# Patient Record
(demographics unavailable — no encounter records)

---

## 2025-05-21 NOTE — END OF VISIT
[Time Spent: ___ minutes] : I have spent [unfilled] minutes of time on the encounter which excludes teaching and separately reported services. Prenatal Vitamins

## 2025-05-21 NOTE — DISCUSSION/SUMMARY
[FreeTextEntry1] : GYN evaluation today TVS done today -- Fibroid uterus, Amenorrhea and AUB. Extremely limited scan due to shadowing from myoma, TVS and TAS approach utilized Anteverted myomatous uterus is noted, endometrium difficult to discern Possible fetal pole noted measuring 6w5d (No fhm seen at this time) Largest fibroid measuring 8.31x6.77x8.00cm  Cervix and ovaries are not visible due to shadowing from myomas. We discussed -- Fibroid uterus, Amenorrhea and AUB causes and treatment options. PLAN: We discussed patient is to f/u next week with [facility] to check fetal heart and with our office the following week unless abnormal findings with [facility]. TVS done today for Fibroid uterus, Amenorrhea and AUB. Patient verbalized understanding of all explanations, and her questions were answered, and all concerns were addressed. Patient was screened for depression - no signs of clinical depression, PHQ-2 on file. RTO in 2 weeks f/u INeto acting as scribe for Dr. Alvarez. 05/21/2025   The documentation recorded by the scribe, in my presence, accurately reflects the service I personally performed, and the decisions made by me with my edits as appropriate on 05/21/2025  Varsha Alvarez MD, FACOG

## 2025-05-21 NOTE — HISTORY OF PRESENT ILLNESS
[FreeTextEntry1] : 41YO F patient presents for abnormal uterine bleeding a week ago. Known  Hx of fibroid uterus. Reports bleeding s/p IVF transfer on 4/21/25; 5 days embryo.

## 2025-05-21 NOTE — PHYSICAL EXAM
[MA] : MA [Appropriately responsive] : appropriately responsive [Alert] : alert [No Acute Distress] : no acute distress [Oriented x3] : oriented x3 [Soft] : soft [Non-tender] : non-tender [Non-distended] : non-distended [FreeTextEntry2] : eNto

## 2025-05-21 NOTE — REASON FOR VISIT
[Follow-Up] : a follow-up evaluation of [Abnormal Uterine Bleeding] : abnormal uterine bleeding [FreeTextEntry2] : Fibroid uterus

## 2025-06-03 NOTE — REASON FOR VISIT
[Follow-Up] : a follow-up evaluation of [FreeTextEntry2] : Fetal viability/ fibroids/ pelvic pain, no bm

## 2025-06-03 NOTE — HISTORY OF PRESENT ILLNESS
[FreeTextEntry1] : 41YO F patient presents for fetal viability. chief c/o: AUB, pelvic pain, fibroids, pelvic pain and no bm  [Mammogramdate] : 6/4/24 [PapSmeardate] : 5/7/25 [HPVDate] : 5/7/25 [Abnormal Quantity] : abnormal quantity [Moderate Bleeding] : moderate bleeding

## 2025-06-03 NOTE — PHYSICAL EXAM
[Chaperoned Physical Exam] : A chaperone was present in the examining room during all aspects of the physical examination. [MA] : MA [FreeTextEntry2] : Neto [Appropriately responsive] : appropriately responsive [Alert] : alert [No Acute Distress] : no acute distress [Oriented x3] : oriented x3 [Labia Majora] : normal [Labia Minora] : normal [Scant] : There was scant vaginal bleeding [Normal] : normal [Uterine Adnexae] : non-palpable

## 2025-06-03 NOTE — DISCUSSION/SUMMARY
[FreeTextEntry1] : GYN evaluation today TVS done today - fibroids Anteverted myomatous uterus is noted,  Endometrium difficult to discern due to shadowing from myoma, Dominant myoma noted measuring 8.54cm, Normal ovaries, No fluid noted in CDS, Limited cervix PLAN: We discussed imaging results difficult to discern due to fibroids, will refer patient for Pelvic MRI, patient expressed the pelvic pain with no bm symptoms prescribed Toradol and advised to try Miralax and increase fiber in her diet to help her void.  Patient verbalized understanding of all explanations, and her questions were answered, and all concerns were addressed. Patient was already screened for depression - PHQ-2 on file. RTO in 1 week for TVS/ f/u   I, Neto Taveras sole acting as scribe for Dr. Alvarez. 06/03/2025

## 2025-06-10 NOTE — PHYSICAL EXAM
[Soft] : soft [Non-tender] : non-tender [FreeTextEntry6] : noted nontender and on Rt side 18 wks and on left side 20 cm

## 2025-06-10 NOTE — HISTORY OF PRESENT ILLNESS
[Patient reported mammogram was normal] : Patient reported mammogram was normal [Patient reported breast sonogram was normal] : Patient reported breast sonogram was normal [LMP unknown] : LMP unknown [Patient reported PAP Smear was normal] : Patient reported PAP Smear was normal [N] : Patient does not use contraception [unknown] : Patient is unsure of the date of her LMP [Women] : women [No] : No [FreeTextEntry1] : 39 yo female presents for fibroid check and s/p miscarriage  [TextBox_4] : 41 yo female presents for follow up s/p spontaneous  (miscarriage ) after [facility] treatment and noted large fibroidss  BHCG level at [facility] on 25= 5084 and previous BHCG at office 6/3/25 noted at 6853 Blood type B (+)  on file at [facility] in 2019.  She states the pain subsided since last visit states still cramps but no longer severe pain as on last visit and bleeding no longer heavy now scant  Toradol was helpful   [Mammogramdate] : 6/4/24 [BreastSonogramDate] : 6/4/24 [PapSmeardate] : 5/7/24

## 2025-06-10 NOTE — DISCUSSION/SUMMARY
[FreeTextEntry1] : complete spontaneous   we discussed all findings as BHCG dropped from 6853 to 5000 and noted decreased bleeding and in sonogram today showing no fluid in endometrial cavity and no sac noted, multiple large myomas largest noted in lower anterior uterus 8x8cm limiting evaluation EE =9mm of the endometrium visualized  BHCG serial exams today in 3 days then in 5 days  follow up in 2 weeks if levels trending down as expected and to check MRI and plan for myomectomy s/p MRI as patient desiring to do myomectomy prior to attempting IVF again.

## 2025-06-25 NOTE — DISCUSSION/SUMMARY
[FreeTextEntry1] : GYN evaluation today TVS done today for -- Fibroid Uterus and Retain product of conception R/O WE discussed -- Fibroid Uterus, Retain product of conception R/O, Fibrocystic breast, UTI and Dyspareunia causes and treatment options. PLAN: We discussed MRI results - Heterogeneous material in the endometrial cavity with enhancement and surrounded by blood products. Findings suspicious for retained products of conception. Today's ultrasound indicated endometrium measures 6.41mm, multiple myomas noted- largest measures 7.67cm Patient verbalized understanding of all explanations, and her questions were answered, and all concerns were addressed. Patient was screened for depression - no signs of clinical depression, PHQ-2 on file. RTO in 2-3 months for discussion of myomectomy   I, Neto Taveras sole acting as scribe for Dr. Alvarez. 06/24/2025   The documentation recorded by the scribe, in my presence, accurately reflects the service I personally performed, and the decisions made by me with my edits as appropriate on 06/25/2025  Varsha Alvarez MD, FACOG

## 2025-06-25 NOTE — HISTORY OF PRESENT ILLNESS
[Patient reported mammogram was normal] : Patient reported mammogram was normal [Patient reported breast sonogram was normal] : Patient reported breast sonogram was normal [Patient reported PAP Smear was normal] : Patient reported PAP Smear was normal [N] : Patient does not use contraception [No] : Patient does not have concerns regarding sex [FreeTextEntry1] : 41 YO F patient presents for GYN check-up exam. Chief c/o: MRI and HCG results / Fibroid Uterus  [Mammogramdate] : 6/4/24 [TextBox_19] : Tc= 14.0% [BreastSonogramDate] : 6/4/24 [TextBox_25] : Tc= 14.0% [PapSmeardate] : 5/7/24 [HPVDate] : 5/7/24 [LMPDate] : spotting

## 2025-06-25 NOTE — PHYSICAL EXAM
[MA] : MA [Appropriately responsive] : appropriately responsive [Alert] : alert [No Acute Distress] : no acute distress [Soft] : soft [Non-tender] : non-tender [Non-distended] : non-distended [Oriented x3] : oriented x3 [FreeTextEntry2] : Neto

## 2025-06-25 NOTE — HISTORY OF PRESENT ILLNESS
[Patient reported mammogram was normal] : Patient reported mammogram was normal [Patient reported breast sonogram was normal] : Patient reported breast sonogram was normal [Patient reported PAP Smear was normal] : Patient reported PAP Smear was normal [N] : Patient does not use contraception [No] : Patient does not have concerns regarding sex [FreeTextEntry1] : 39 YO F patient presents for GYN check-up exam. Chief c/o: MRI and HCG results / Fibroid Uterus  [Mammogramdate] : 6/4/24 [TextBox_19] : Tc= 14.0% [BreastSonogramDate] : 6/4/24 [PapSmeardate] : 5/7/24 [TextBox_25] : Tc= 14.0% [HPVDate] : 5/7/24 [LMPDate] : spotting

## 2025-06-25 NOTE — REASON FOR VISIT
[Follow-Up] : a follow-up evaluation of [Dyspareunia] : dyspareunia [FreeTextEntry2] : Fibroid Uterus / s/p miscarriage